# Patient Record
(demographics unavailable — no encounter records)

---

## 2025-05-28 NOTE — PHYSICAL EXAM
[Midline] : trachea located in midline position [Laryngoscopy Performed] : laryngoscopy was performed, see procedure section for findings [Normal] : no rashes [de-identified] : soft, flat, no mass/lesion [de-identified] : posterior opx clear

## 2025-05-28 NOTE — PROCEDURE
[FreeTextEntry3] : Fiberoptic LAryngoscopy: upper airway clear no masses/lesions TVF mobile minimal postcricoid edema

## 2025-05-28 NOTE — ASSESSMENT
[FreeTextEntry1] : 37M w globus for 3 weeks- Head and neck exam, including laryngoscopy, is nonfocal and unremarkable. I would bet this is mostly LPR given his history and normal exam - will try daily PPI and H2B with diet/lifestyle. If sx persist, RTO 6 weeks.

## 2025-05-28 NOTE — CONSULT LETTER
[Dear  ___] : Dear  [unfilled], [Courtesy Letter:] : I had the pleasure of seeing your patient, [unfilled], in my office today. [Consult Closing:] : Thank you very much for allowing me to participate in the care of this patient.  If you have any questions, please do not hesitate to contact me. [Sincerely,] : Sincerely, [FreeTextEntry3] : Carter Butts MD Department of Otolaryngology, Head and Neck Surgery

## 2025-05-28 NOTE — HISTORY OF PRESENT ILLNESS
[de-identified] : 37M here for initial evaluation.  For the past 3 weeks he c/o feeling of foreign body in his throat as if something is stuck. There is no difficulty eating, breathing, swallowing or talking. There is no weight loss or regurgitation. He does vape. He has known reflux and takes occasional PPI.'  CT neck wo contrast 5/27 -> officla report is pending, but looks normal on my review  ROS otherwise unremarkable.